# Patient Record
Sex: FEMALE | Race: OTHER | URBAN - METROPOLITAN AREA
[De-identification: names, ages, dates, MRNs, and addresses within clinical notes are randomized per-mention and may not be internally consistent; named-entity substitution may affect disease eponyms.]

---

## 2024-01-23 ENCOUNTER — HOSPITAL ENCOUNTER (OUTPATIENT)
Dept: ONCOLOGY | Age: 49
Setting detail: INFUSION SERIES
Discharge: HOME | End: 2024-01-23
Payer: SUBSIDIZED

## 2024-01-23 LAB
ABO + RH BLD: NORMAL
ALBUMIN SERPL-MCNC: 4 G/DL (ref 3.4–5)
ALBUMIN/GLOB SERPL: 1.2 {RATIO} (ref 1.1–2.2)
ALP SERPL-CCNC: 103 U/L (ref 40–129)
ALT SERPL-CCNC: 19 U/L (ref 10–40)
ANION GAP SERPL CALCULATED.3IONS-SCNC: 8 MMOL/L (ref 3–16)
APTT BLD: 26.7 SEC (ref 22.7–35.9)
AST SERPL-CCNC: 21 U/L (ref 15–37)
BASOPHILS # BLD: 0 K/UL (ref 0–0.2)
BASOPHILS NFR BLD: 0.4 %
BILIRUB SERPL-MCNC: 0.6 MG/DL (ref 0–1)
BILIRUB UR QL STRIP.AUTO: NEGATIVE
BLD GP AB SCN SERPL QL: NORMAL
BUN SERPL-MCNC: 11 MG/DL (ref 7–20)
CALCIUM SERPL-MCNC: 9.6 MG/DL (ref 8.3–10.6)
CHLORIDE SERPL-SCNC: 103 MMOL/L (ref 99–110)
CLARITY UR: CLEAR
CO2 SERPL-SCNC: 28 MMOL/L (ref 21–32)
COLOR UR: YELLOW
CREAT SERPL-MCNC: 0.6 MG/DL (ref 0.6–1.1)
DEPRECATED RDW RBC AUTO: 12.9 % (ref 12.4–15.4)
EOSINOPHIL # BLD: 0.2 K/UL (ref 0–0.6)
EOSINOPHIL NFR BLD: 4 %
GFR SERPLBLD CREATININE-BSD FMLA CKD-EPI: >60 ML/MIN/{1.73_M2}
GLUCOSE SERPL-MCNC: 92 MG/DL (ref 70–99)
GLUCOSE UR STRIP.AUTO-MCNC: NEGATIVE MG/DL
HCG SERPL QL: NEGATIVE
HCT VFR BLD AUTO: 38.8 % (ref 36–48)
HGB BLD-MCNC: 12.8 G/DL (ref 12–16)
HGB UR QL STRIP.AUTO: NEGATIVE
INR PPP: 1.02 (ref 0.84–1.16)
KETONES UR STRIP.AUTO-MCNC: NEGATIVE MG/DL
LDH SERPL L TO P-CCNC: 143 U/L (ref 100–190)
LEUKOCYTE ESTERASE UR QL STRIP.AUTO: NEGATIVE
LYMPHOCYTES # BLD: 2.2 K/UL (ref 1–5.1)
LYMPHOCYTES NFR BLD: 37.6 %
MAGNESIUM SERPL-MCNC: 2.2 MG/DL (ref 1.8–2.4)
MCH RBC QN AUTO: 28.7 PG (ref 26–34)
MCHC RBC AUTO-ENTMCNC: 33 G/DL (ref 31–36)
MCV RBC AUTO: 87.2 FL (ref 80–100)
MONOCYTES # BLD: 0.3 K/UL (ref 0–1.3)
MONOCYTES NFR BLD: 5.6 %
NEUTROPHILS # BLD: 3.1 K/UL (ref 1.7–7.7)
NEUTROPHILS NFR BLD: 52.4 %
NITRITE UR QL STRIP.AUTO: NEGATIVE
PH UR STRIP.AUTO: 7 [PH] (ref 5–8)
PLATELET # BLD AUTO: 343 K/UL (ref 135–450)
PMV BLD AUTO: 7.6 FL (ref 5–10.5)
POTASSIUM SERPL-SCNC: 4.2 MMOL/L (ref 3.5–5.1)
PROT SERPL-MCNC: 7.3 G/DL (ref 6.4–8.2)
PROT UR STRIP.AUTO-MCNC: NEGATIVE MG/DL
PROTHROMBIN TIME: 13.5 SEC (ref 11.5–14.8)
RBC # BLD AUTO: 4.46 M/UL (ref 4–5.2)
SODIUM SERPL-SCNC: 139 MMOL/L (ref 136–145)
SP GR UR STRIP.AUTO: 1.02 (ref 1–1.03)
UA DIPSTICK W REFLEX MICRO PNL UR: NORMAL
URN SPEC COLLECT METH UR: NORMAL
UROBILINOGEN UR STRIP-ACNC: 0.2 E.U./DL
WBC # BLD AUTO: 5.9 K/UL (ref 4–11)

## 2024-01-23 PROCEDURE — 84703 CHORIONIC GONADOTROPIN ASSAY: CPT

## 2024-01-23 PROCEDURE — 86850 RBC ANTIBODY SCREEN: CPT

## 2024-01-23 PROCEDURE — 80053 COMPREHEN METABOLIC PANEL: CPT

## 2024-01-23 PROCEDURE — 83615 LACTATE (LD) (LDH) ENZYME: CPT

## 2024-01-23 PROCEDURE — 85730 THROMBOPLASTIN TIME PARTIAL: CPT

## 2024-01-23 PROCEDURE — 83735 ASSAY OF MAGNESIUM: CPT

## 2024-01-23 PROCEDURE — 86900 BLOOD TYPING SEROLOGIC ABO: CPT

## 2024-01-23 PROCEDURE — 86901 BLOOD TYPING SEROLOGIC RH(D): CPT

## 2024-01-23 PROCEDURE — 85610 PROTHROMBIN TIME: CPT

## 2024-01-23 PROCEDURE — 93005 ELECTROCARDIOGRAM TRACING: CPT | Performed by: STUDENT IN AN ORGANIZED HEALTH CARE EDUCATION/TRAINING PROGRAM

## 2024-01-23 PROCEDURE — 81003 URINALYSIS AUTO W/O SCOPE: CPT

## 2024-01-23 PROCEDURE — 85025 COMPLETE CBC W/AUTO DIFF WBC: CPT

## 2024-01-23 NOTE — PROGRESS NOTES
Pt arrived for BMT Teaching and Evaluation with RN Transplant Coordinator Isabel ANDERS  Labs drawn and sent.  Pt denied additional questions or concerns. Discharged ambulatory to her home.    Jaime Pedro RN

## 2024-01-23 NOTE — PROGRESS NOTES
Encounter Date: 01/23/24   EKG 12 Lead   Result Value    Ventricular Rate 80    Atrial Rate 80    P-R Interval 138    QRS Duration 66    Q-T Interval 384    QTc Calculation (Bazett) 442    P Axis 40    R Axis 61    T Axis 59    Diagnosis      Normal sinus rhythm with sinus arrhythmia Low voltage QRSNonspecific T wave abnormalityAbnormal ECG

## 2024-01-23 NOTE — ONCOLOGY
[facility] bone marrow donor here for physical exam, labs and education in preparation for bone marrow harvest on 2/19/24.  used to explain the following:    Bone Marrow [place] Procedure:    Before your surgery begins the nurse will start an IV to give you fluids before, during and after the procedure.  You may also be given medication to make you sleepy before going into the operating room.  Family members may remain with you until this time and then they will be asked to go to the Surgery Waiting Area.    Once in the operating room, the nurses and doctors will be wearing masks.  An anesthesiologist will give you medication through your IV to put you to sleep.  Once you are asleep, a small tube will be placed down your windpipe (trachea) to maintain an open airway and assist your breathing.  Then you will be turned over onto your stomach and covered with sterile drapes.    The harvesting of your bone marrow is done by the insertion of a needle into the center of your hipbones.  Bone marrow is then withdrawn through the needle.  There will be 1 to 2 skin punctures (harvest sites) on each side of your pelvis.    Once the procedure is finished, a large pressure bandage will be placed over the harvest sites to help prevent bleeding.  Then you will be transferred to the recovery unit where your family can visit you.    The entire hospital time for the bone marrow harvest should last 6-10 hours (allowing time for the induction of anesthesia, the harvest and recovery).  You may also receive your own red blood cells back through your IV.      Don’t get up for the first time after the procedure without assistance, as you may experience dizziness. Ask for assistance from the nursing staff if you want to get up and walk around after donation. Even though you might be stiff and sore, many donors report feeling better if they are able to move around.    You will be discharged from the recovery room after you are

## 2024-01-24 LAB
EKG ATRIAL RATE: 80 BPM
EKG DIAGNOSIS: NORMAL
EKG P AXIS: 40 DEGREES
EKG P-R INTERVAL: 138 MS
EKG Q-T INTERVAL: 384 MS
EKG QRS DURATION: 66 MS
EKG QTC CALCULATION (BAZETT): 442 MS
EKG R AXIS: 61 DEGREES
EKG T AXIS: 59 DEGREES
EKG VENTRICULAR RATE: 80 BPM